# Patient Record
Sex: FEMALE | Race: WHITE | Employment: FULL TIME | URBAN - METROPOLITAN AREA
[De-identification: names, ages, dates, MRNs, and addresses within clinical notes are randomized per-mention and may not be internally consistent; named-entity substitution may affect disease eponyms.]

---

## 2022-08-15 NOTE — H&P
Assessment/Plan  # Detail Type Description    1. Assessment Dysphagia, oropharyngeal phase (R13.12). Impression Pt of Dr. Sweta Willams, here for 5yr Recall, for surveillance of colonic adenomas last addressed in 2016. *FHx of (CRC) Colorectal cancer: -Paternal Grandmother: Colon Cancer (Onset Age: 66's)    She also has alarm features of Upper GI complaints, including: Dysphagia, Dysphonia, Globus Sensation, Cough, Gagging, and Abnormal weight loss to be evaluated.  ___________________________________  Dysphagia to solids only x7 months   Gagging, coughing, Globus sensation and sore throat  Pt says that when she bends or lays down she feels a lump in her throat. Pt had a MRI of the throat (see in chart),    Pt never had a EGD    -Previous smoker: Quit 7 years ago in June  -Previous EtOH use: Quit 10-12 years ago. Patient Plan *EGD Plan: **Schedule 1st**   Will proceed with EGD with Dr. Bhavna Lopez, to evaluate upper GI tract for abnormalities, evidence to explain symptoms, and Dixon's epithelium with biopsies, polypectomy, or dilation as indicated. -Patient is advised that they should take their aspirin (if prescribed) up until the day of procedure.  -Patient is advised to take Thyroid meds, BP meds, beta blocker and any cardiac meds the AM of procedure with sip clear liquid, 4 hours prior to procedure start time. -Bring inhalers to procedure.  -Diabetic medication instructions (See Below). *EGD Risks:  Explained risks of procedure to include bleeding, infection, reaction to sedation, and perforation with possible need for admission to the hospital, and in the most extensive of  circumstances, the patient may require surgery. Pt verbalized understanding of these risks and is agreeable with this procedure.   ______________________________________   *Dysphagia - \"Safe Swallowing\" Precautions - Patient advised to:  (Printed copy provided to patient)  1) Take your time when eating. 2) Sit up straight.   3) Take small bites and sips. 4) Chew food thoroughly before swallowing. 5) Reduce talking and distractions during meals. 6) Make sure your mouth is empty before taking the next bite. Plan Orders Further diagnostic evaluations ordered today include(s) UPPER GI ENDOSCOPY, DIAGNOSIS to be performed. She will be scheduled for GASTROENTEROLOGY PROCEDURE, Next Lab Date is within 4  month on 08/03/2022. Clinical information/comments: at location [facility]. The surgeon scheduled is Nando Frey MD. An assistant has not been requested. 2. Assessment Dysphonia (R49.0). Impression Pt states she can feel pressure on her vocal cords when bending forward, and when laying on left side she experiences Dysphonia. Pt attributes these symptoms to possible polyps on vocal cords, and states it feels like whatever is in her throat is causing the pressure that traps her food at the cervical level, and causes the globus sensation, coughing and gagging. Patient Plan *Referral Placed:   -ENT Referral- to Dr. Mine Taveras, for evaluation and treatment of Dysphonia (?Vocal Cord Polyps)         3. Assessment Feeling of foreign body in throat (R09.89). Impression See above. 4. Assessment Abnormal weight loss (R63.4). Impression Pt has had a great deal of dental work done lately, with all teeth pulled, and further plans in 3 more months to place implants in lower jaw to anchor dentures to. This has likely contributed to unintentional weight loss, secondary to dental pain, in addition to family-related stress as she has a large amount of caregiver-role burden as well. 5. Assessment Personal history of colonic polyps (Z86.010). Impression Pt of Dr. Mesfin Marques, here for 5yr Recall, for surveillance of colonic adenomas last addressed in 2016.     *FHx of (CRC) Colorectal cancer:   -Paternal Grandmother: Colon Cancer (Onset Age: 66's)  _________________________________  *Last colonoscopy (1st exam- @50yo) was done 6/13/2016 by Dr. Maria Guadalupe Tong @[facility]: Extremely tortuous and fixed sigmoid. 1x small 6mm sessile polyp in the sigmoid colon at 25cm, cold snared (Hyperplastic). 1x sessile polyp 6.5mm, hot snared from the Proximal Transverse colon (Adenomatous Polyp Fragments). 5yr Recall recommended.  _________________________________  Asymptomatic of Lower GI complaints. BMI: 24.46 (Short Frame), BM: 1x daily. Patient Plan *C-scope Plan: **Schedule 2nd**   Colonoscopy ordered with Dr. Maria Guadalupe Tong with Miralax bowel prep, and Mag Citrate 2 days before prep, and Miralax and stool softeners starting 3 days before prep.  -Patient is advised that they should take their aspirin (if prescribed) up until the day of procedure.  -Patient is advised to take Thyroid meds, BP meds, beta blockers, and any cardiac meds the AM of procedure with sip clear liquid after prep. -Bring inhalers to procedure.  -Diabetic medication instructions (See Below). *C-scope Risks:  Stressed importance of following all bowel preparation instructions. Explained the procedure to the patient including all risks and benefits. These risks consist of missed lesions on exam, bleeding, and bowel perforation with possible need for admission to the hospital, and in the most extensive of  circumstances, the patient may require surgery. Pt verbalized understanding of these risks and is agreeable with this procedure. Plan Orders Further diagnostic evaluations ordered today include(s) DIAGNOSTIC COLONOSCOPY to be performed. She will be scheduled for GASTROENTEROLOGY PROCEDURE, Next Lab Date is within 4  month on 08/16/2022. Clinical information/comments: at location [facility]. The surgeon scheduled is Teresita Maria MD. An assistant has not been requested. 6. Assessment Family history of malignant neoplasm of digestive organ (Z80.0). Impression *FHx of (CRC) Colorectal cancer:   -Paternal Grandmother: Colon Cancer (Onset Age: 66's). 7. Assessment Type 2 diabetes mellitus with diabetic polyneuropathy, without long-term current use of insulin (E11.42). Impression Diabetic patient, on oral medication: Metformin. Patient Plan -Patient is advised not to take pills for diabetes the day ahead of the procedure. Patient is advised (if insulin dependent) to take half of the usual long acting insulin the day before procedure (or as advised by the prescribing provider), and to follow accuchecks closely, 'rescuing' for a blood sugar below 90              This 64year old  patient was referred by Ramses Shaw. This 64year old female presents for Hx polyp/colon cancer and Dysphagia. History of Present Illness  1. Hx polyp/colon cancer   Prior screening:  colonoscopy. Risk Factors: Paternal Grandmother. Pertinent negatives include abdominal pain, change in bowel habits, change in stool caliber, constipation, decreased appetite, diarrhea, melena, nausea, rectal bleeding, vomiting, weight gain and weight loss. Additional information: Patient has family history of colon cancer and Family  hx of colon ca ( Paternal Grandmother age: 66's). BM: 1x daily. Comments: *Last colonoscopy (1st exam- @50yo) was done 6/13/2016 by Dr. Sarwat Huang @[facility]: Extremely tortuous and fixed sigmoid. 1x small 6mm sessile polyp in the sigmoid colon at 25cm, cold snared (Hyperplastic). 1x sessile polyp 6.5mm, hot snared from the Proximal Transverse colon (Adenomatous Polyp Fragments). 5yr Recall recommended. 2.  Dysphagia   The difficulty in swallowing began 7 months ago and lasted varies. The symptoms are severe, worsened and occur constantly. The symptoms occur with solids only and cause choking and gagging. The patient denies aggravating factors. The patient denies relieving factors. The patient is also experiencing bloating, choking, cough, food sticking, foreign body sensation in throat and sore throat.  The patient denies anorexia, back pain, chest pain, chest pressure or discomfort, cough following swallowing, decreased appetite, early satiety, epigastric pain, food on pillow in AM, forced regurgitation, halitosis, heartburn, hoarseness, nasal regurgitation, nausea, oral regurgitation, vomiting, weight gain or weight loss. Additional information:  Pt says that when she bends or lays down she feels a lump in her throat. pt had a MRI of the throat ( see in chart),  Pt never had a EGD.           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     Unequal blood pressure in arms 04/05/2022 N         Medications (active prior to today)  Medication Instructions Start Date Stop Date Refilled Elsewhere   ProAir HFA 90 mcg/actuation aerosol inhaler inhale 2 puff by INHALATION route 4 times every day as needed 06/21/2021 06/21/2021 N   EpiPen 2-Casey 0.3 mg/0.3 mL injection, auto-injector inject 0.3 milliliter by intramuscular route once as needed for anaphylaxis 06/21/2021 06/21/2021 N   losartan 100 mg tablet take 1 tablet by oral route  every day 08/21/2021 08/21/2021 N   Praluent Pen 75 mg/mL subcutaneous pen injector inject 1 milliliter by subcutaneous route  every 2 weeks in the abdomen, thigh, or upper arm rotating injection sites 11/15/2021  11/15/2021 N   Narcan 4 mg/actuation nasal spray spray 0.1 milliliter by intranasal route in 1 nostril may repeat dose every 2-3 minutes as needed alternating nostrils with each dose 01/05/2022   N   METFORMIN ER TAB 500MG GP TAKE 2 TABLETS DAILY WITH  THE EVENING MEAL 01/25/2022 01/25/2022 N   Xanax 0.5 mg tablet Take 1 tablet by mouth three times daily as needed 03/04/2022 03/04/2022 N   gabapentin 300 mg capsule take 1 capsule by oral route 3 times every day 03/23/2022 03/23/2022 N     Patient Status   Completed with information received for patient in a summary of care record. Medication Reconciliation  Medications reconciled today.     Medication Reviewed  Adherence Medication Name Sig Desc Elsewhere Status   taking as directed METFORMIN ER TAB 500MG GP TAKE 2 TABLETS DAILY WITH  THE EVENING MEAL N Verified   taking as directed ProAir HFA 90 mcg/actuation aerosol inhaler inhale 2 puff by INHALATION route 4 times every day as needed N Verified   taking as directed Praluent Pen 75 mg/mL subcutaneous pen injector inject 1 milliliter by subcutaneous route  every 2 weeks in the abdomen, thigh, or upper arm rotating injection sites N Verified   taking as directed gabapentin 300 mg capsule take 1 capsule by oral route 3 times every day N Verified   taking as directed EpiPen 2-Casey 0.3 mg/0.3 mL injection, auto-injector inject 0.3 milliliter by intramuscular route once as needed for anaphylaxis N Verified   taking as directed Xanax 0.5 mg tablet Take 1 tablet by mouth three times daily as needed N Verified   taking as directed Narcan 4 mg/actuation nasal spray spray 0.1 milliliter by intranasal route in 1 nostril may repeat dose every 2-3 minutes as needed alternating nostrils with each dose N Verified   taking as directed losartan 100 mg tablet take 1 tablet by oral route  every day N Verified     Medications (Added, Continued or Stopped today)  Start Date Medication Directions PRN Status PRN Reason Instruction Stop Date   06/21/2021 EpiPen 2-Casey 0.3 mg/0.3 mL injection, auto-injector inject 0.3 milliliter by intramuscular route once as needed for anaphylaxis N      03/23/2022 gabapentin 300 mg capsule take 1 capsule by oral route 3 times every day N      08/21/2021 losartan 100 mg tablet take 1 tablet by oral route  every day N      01/25/2022 METFORMIN ER TAB 500MG GP TAKE 2 TABLETS DAILY WITH  THE EVENING MEAL N      01/05/2022 Narcan 4 mg/actuation nasal spray spray 0.1 milliliter by intranasal route in 1 nostril may repeat dose every 2-3 minutes as needed alternating nostrils with each dose N      11/15/2021 Praluent Pen 75 mg/mL subcutaneous pen injector inject 1 milliliter by subcutaneous route  every 2 weeks in the abdomen, thigh, or upper arm rotating injection sites N      06/21/2021 ProAir HFA 90 mcg/actuation aerosol inhaler inhale 2 puff by INHALATION route 4 times every day as needed N      03/04/2022 Xanax 0.5 mg tablet Take 1 tablet by mouth three times daily as needed N        Allergies  Ingredient Reaction (Severity) Medication Name Comment   LATEX Anaphylaxis     LEVOFLOXACIN Hives     ONDANSETRON HCL Rash (moderate to severe) Zofran    PENICILLINS Anaphylaxis       Reviewed, no changes. Review of Systems  System Neg/Pos Details   Constitutional Negative Fever, Weight gain and Weight loss. ENMT Positive Foreign body sensation in throat, Sore throat. ENMT Negative Halitosis, Hoarseness and Sinus Infection. Eyes Negative Double vision. Respiratory Positive Cough. Respiratory Negative Asthma, Chronic cough and Dyspnea. Cardio Negative Chest pain, Chest pressure or discomfort, Edema and Irregular heartbeat/palpitations. GI Positive Bloating, Choking, Food sticking. GI Negative Abdominal pain, Anorexia, Change in bowel habits, Change in stool caliber, Constipation, Cough following swallowing, Decreased appetite, Diarrhea, Dysphagia, Early satiety, Epigastric pain, Food on pillow in AM, Forceful vomiting, Heartburn, Hematemesis, Hematochezia, Melena, Nasal regurgitation, Nausea, Oral regurgitation, Rectal bleeding, Reflux and Vomiting.  Negative Dysuria and Hematuria. Endocrine Negative Cold intolerance and Heat intolerance.    Neuro Negative Dizziness, Headache, Numbness and Tremors. Psych Negative Anxiety, Depression and Increased stress. Integumentary Negative Hives, Pruritus and Rash. MS Negative Back pain, Joint pain and Myalgia. Hema/Lymph Negative Easy bleeding, Easy bruising and Lymphadenopathy. Allergic/Immuno Negative Food allergies and Immunosuppression. Vital Signs   Height  Time ft in cm Last Measured Height Position   1:27 PM 5.0 5.00 165.10 04/05/2022 Standing     Weight/BSA/BMI  Time lb oz kg Context BMI kg/m2 BSA m2   1:27 .00  66.678 dressed with shoes 24.46 1.75     16/22 1105 -- 65 159/77 Abnormal  Sinus Rhythm    08/16/22 1100 -- 69 145/67 Abnormal  Sinus Rhythm    08/16/22 0945 97.3 °F (36.3 °C) 69 128/63 --        Respiratory Therapy (last day)    Date/Time Resp SpO2 O2 Device O2 Flow Rate (L/min) [facility]   08/16/22 1105 14 99 % None (Room air) --    08/16/22 1100 18 97 % None (Room air) --    08/16/22 0945 18 99 % None (Room air) --      Physical  Exam  Exam Findings Details   Female GI Quick Visit Comments Short Frame. Constitutional Normal Well developed. Eyes Normal Conjunctiva - Right: Normal, Left: Normal. Sclera - Right: Normal, Left: Normal.   Nasopharynx Normal Lips/teeth/gums - Normal.   Neck Exam Normal Inspection - Normal.   Respiratory Normal Inspection - Normal.   Cardiovascular Normal Regular rate and rhythm. No murmurs, gallops, or rubs. Vascular Normal Pulses - Brachial: Normal.   Skin Normal Inspection - Normal.   Musculoskeletal Normal Hands/Wrist - Right: Normal, Left: Normal.   Extremity Normal No edema. Neurological Normal Fine motor skills - Normal.   Psychiatric Normal Orientation - Oriented to time, place, person & situation. Appropriate mood and affect. Patient Education  # Patient Education   1.  Colon Polyps: Care Instructions         Active Patient Care Team Members  Name Contact Agency Type Support Role Relationship Active Date Inactive Date Specialty   Barbara Hanks   Patient provider PCP   [facility]   encounter provider    Gastroenterology     No change in H&P

## 2022-08-16 ENCOUNTER — HOSPITAL ENCOUNTER (OUTPATIENT)
Age: 57
Setting detail: OUTPATIENT SURGERY
Discharge: HOME | End: 2022-08-16
Attending: INTERNAL MEDICINE | Admitting: INTERNAL MEDICINE
Payer: COMMERCIAL

## 2022-08-16 VITALS
OXYGEN SATURATION: 97 % | WEIGHT: 146 LBS | DIASTOLIC BLOOD PRESSURE: 72 MMHG | HEART RATE: 67 BPM | SYSTOLIC BLOOD PRESSURE: 134 MMHG | HEIGHT: 65 IN | BODY MASS INDEX: 24.32 KG/M2 | TEMPERATURE: 97.9 F | RESPIRATION RATE: 18 BRPM

## 2022-08-16 LAB — GLUCOSE BLD STRIP.AUTO-MCNC: 126 MG/DL (ref 70–110)

## 2022-08-16 PROCEDURE — 77030003657 HC NDL SCLER BSC -B: Performed by: INTERNAL MEDICINE

## 2022-08-16 PROCEDURE — 88305 TISSUE EXAM BY PATHOLOGIST: CPT

## 2022-08-16 PROCEDURE — 76040000009: Performed by: INTERNAL MEDICINE

## 2022-08-16 PROCEDURE — 99153 MOD SED SAME PHYS/QHP EA: CPT | Performed by: INTERNAL MEDICINE

## 2022-08-16 PROCEDURE — 2709999900 HC NON-CHARGEABLE SUPPLY: Performed by: INTERNAL MEDICINE

## 2022-08-16 PROCEDURE — 77030013991 HC SNR POLYP ENDOSC BSC -A: Performed by: INTERNAL MEDICINE

## 2022-08-16 PROCEDURE — 77030013992 HC SNR POLYP ENDOSC BSC -B: Performed by: INTERNAL MEDICINE

## 2022-08-16 PROCEDURE — 77030020268 HC MISC GENERAL SUPPLY: Performed by: INTERNAL MEDICINE

## 2022-08-16 PROCEDURE — 74011250636 HC RX REV CODE- 250/636: Performed by: INTERNAL MEDICINE

## 2022-08-16 PROCEDURE — G0500 MOD SEDAT ENDO SERVICE >5YRS: HCPCS | Performed by: INTERNAL MEDICINE

## 2022-08-16 PROCEDURE — 82962 GLUCOSE BLOOD TEST: CPT

## 2022-08-16 PROCEDURE — 77030040361 HC SLV COMPR DVT MDII -B: Performed by: INTERNAL MEDICINE

## 2022-08-16 RX ORDER — MIDAZOLAM HYDROCHLORIDE 1 MG/ML
INJECTION, SOLUTION INTRAMUSCULAR; INTRAVENOUS AS NEEDED
Status: DISCONTINUED | OUTPATIENT
Start: 2022-08-16 | End: 2022-08-16 | Stop reason: HOSPADM

## 2022-08-16 RX ORDER — FENTANYL CITRATE 50 UG/ML
INJECTION, SOLUTION INTRAMUSCULAR; INTRAVENOUS AS NEEDED
Status: DISCONTINUED | OUTPATIENT
Start: 2022-08-16 | End: 2022-08-16 | Stop reason: HOSPADM

## 2022-08-16 RX ORDER — SODIUM CHLORIDE 9 MG/ML
125 INJECTION, SOLUTION INTRAVENOUS CONTINUOUS
Status: DISCONTINUED | OUTPATIENT
Start: 2022-08-16 | End: 2022-08-16 | Stop reason: HOSPADM

## 2022-08-16 RX ORDER — LOSARTAN POTASSIUM AND HYDROCHLOROTHIAZIDE 12.5; 5 MG/1; MG/1
1 TABLET ORAL DAILY
COMMUNITY

## 2022-08-16 RX ORDER — ALIROCUMAB 75 MG/ML
75 INJECTION, SOLUTION SUBCUTANEOUS EVERY 2 WEEKS
COMMUNITY

## 2022-08-16 RX ADMIN — SODIUM CHLORIDE 125 ML/HR: 9 INJECTION, SOLUTION INTRAVENOUS at 09:45

## 2022-08-16 NOTE — DISCHARGE INSTRUCTIONS
Chris Velasquez  [id number]  1965    COLON DISCHARGE INSTRUCTIONS    Discomfort:  Redness at IV site- apply warm compress to area; if redness or soreness persist- contact your physician  There may be a slight amount of blood passed from the rectum  Gaseous discomfort- walking, belching will help relieve any discomfort  You may not operate a vehicle til the next day. You may not engage in an occupation involving machinery or appliances til the next day. You may not drink alcoholic beverages til the next day. DIET:   High fiber diet. ACTIVITY:  You may not  resume your normal daily activities til the next day. it is recommended that you spend the remainder of the day resting -  avoid any strenuous activity. CALL M.D.  IF ANY SIGN OF:   Increasing pain, nausea, vomiting  Abdominal distension (swelling)  New increased bleeding (oral or rectal)  Fever (chills)  Pain in chest area  Bloody discharge from nose or mouth  Shortness of breath    You may not  take any Advil, Aspirin, Ibuprofen, Motrin, Aleve, or Goodys for 14 days, ONLY  Tylenol as needed for pain. Post procedure diagnosis:  tortuous sigmoid; polyps; Follow-up Instructions: Your follow up colonoscopy will be in 3 years. We will notify you the results of your biopsy by letter within 2 weeks. Armando Moreira MD  August 16, 2022     DISCHARGE SUMMARY from Nurse    PATIENT INSTRUCTIONS:    After general anesthesia or intravenous sedation, for 24 hours or while taking prescription Narcotics:  Limit your activities  Do not drive and operate hazardous machinery  Do not make important personal or business decisions  Do  not drink alcoholic beverages  If you have not urinated within 8 hours after discharge, please contact your surgeon on call.     Report the following to your surgeon:  Excessive pain, swelling, redness or odor of or around the surgical area  Temperature over 100.5  Nausea and vomiting lasting longer than 4 hours or if unable to take medications  Any signs of decreased circulation or nerve impairment to extremity: change in color, persistent  numbness, tingling, coldness or increase pain  Any questions    What to do at Home:  Recommended activity: NODRIVING/NODRINKING ALCOHOL/NO RECREATIONAL DRUG USE/NO LEGAL/CRITICAL DECISION MAKING    If you experience any of the following symptoms AS ABOVE, please follow up with DR. SIDDIQI'S OFFICE. *  Please give a list of your current medications to your Primary Care Provider. *  Please update this list whenever your medications are discontinued, doses are      changed, or new medications (including over-the-counter products) are added. *  Please carry medication information at all times in case of emergency situations. These are general instructions for a healthy lifestyle:    No smoking/ No tobacco products/ Avoid exposure to second hand smoke  Surgeon General's Warning:  Quitting smoking now greatly reduces serious risk to your health. Obesity, smoking, and sedentary lifestyle greatly increases your risk for illness    A healthy diet, regular physical exercise & weight monitoring are important for maintaining a healthy lifestyle    You may be retaining fluid if you have a history of heart failure or if you experience any of the following symptoms:  Weight gain of 3 pounds or more overnight or 5 pounds in a week, increased swelling in our hands or feet or shortness of breath while lying flat in bed. Please call your doctor as soon as you notice any of these symptoms; do not wait until your next office visit. The discharge information has been reviewed with the patient and caregiver. The patient and caregiver verbalized understanding. Discharge medications reviewed with the patient and caregiver and appropriate educational materials and side effects teaching were provided. Patient armband removed and shredded    ___________________________________________________________________________________________________________________________________

## 2022-08-16 NOTE — PROCEDURES
[facility]  Colonoscopy Procedure Report  _______________________________________________________  Patient: Nhan Begum                                        Attending Physician: Pernell Romero MD    Patient ID: [id number]                                    Referring Physician: Rhys Carroll MD    Exam Date: 8/16/2022      Introduction: A  62 y.o. female patient, presents for inpatient Colonoscopy    Indications: Pt of Dr. Clement Lanza, here for surveillance of colonic adenomas. FHx of (CRC) -Paternal Grandmother: Colon Cancer (Onset Age: 69's)Last colonoscopy (1st exam- @50yo) was done 6/13/2016 by Dr. Abdulkadir Gonzalez @[facility]: Extremely tortuous and fixed sigmoid. 1x small 6mm sessile polyp in the sigmoid colon at 25cm, cold snared (Hyperplastic). 1x sessile polyp 6.5mm, hot snared from the Proximal Transverse colon (Adenomatous Polyp Fragments). Asymptomatic of Lower GI complaints. BMI: 24.46 (Short Frame), BM: 1x daily. Had 2 c section and total hysterectomy for endometriosis. Consent: The benefits, risks, and alternatives to the procedure were discussed and informed consent was obtained from the patient. Preparation: EKG, pulse, pulse oximetry and blood pressure were monitored throughout the procedure. ASA Classification: Class II- . The heart is an S1-S2 and regular heart rate and rhythm. Lungs are clear to auscultation and percussion. Abdomen is soft, nondistended, and nontender. Mental Status: awake, alert, and oriented to person, place, and time    Medications:  Fentanyl 300 mcg IV and Versed 13 mg IV,   Rectal Exam: Normal Rectal Exam. No Blood. Pathology Specimens:  2    Procedure: The colonoscope was passed with extreme difficulty through the anus under direct visualization and advanced to the cecum. The patient required positioning on the back and external counter pressure to aid in the passage of the scope. The scope was withdrawn and the mucosa was carefully examined.  The quality of the preparation was very good. The views were excellent. The patient's toleration of the procedure was poor. The exam was done a second time to the cecum using the gastroscope. Total time is 86 minutes and withdrawal time is 63 minutes. Findings:    Rectum:   Medium sized internal hemorrhoids. Sigmoid:   Extremely difficult tortuous, loopy and fixed sigmoid. 4 polyps found one flat in the distal part at 20 cm, 7 mm hot snared the 2 others one 5 mm hyperplastic, cold snared 2 adjacent sessile 4 and 6 mm polyps in the proximal sigmoid at very difficult and unstable turn. The smaller one is cold snared but was unable to relocate the second one next to it even after multiple difficult passes and using the gastroscope. After 86 minutes. I decided to stop. Descending Colon:   Normal   Transverse Colon:   Normal   Ascending Colon:   Normal  Cecum:   Lipomatosis of the ileocecal valve. 14 mm flat cecal polyp, removed in one block using the 30 mm hot snare after saline injection then a metallic clip is applied to the polypectomy site to prevent delayed complication. Terminal Ileum:   Not entered. Unplanned Events: There were no unplanned events. Estimated Blood Loss: None  IMPLANTS: * No implants in log *  Impressions: Medium sized internal hemorrhoids. Extremely difficult tortuous, loopy and fixed sigmoid. 4 polyps found one flat in the distal part at 20 cm, 7 mm hot snared the 2 others one 5 mm hyperplastic, cold snared 2 adjacent sessile 4 and 6 mm polyps in the proximal sigmoid at very difficult and unstable turn. The smaller one is cold snared but was unable to relocate the second one next to it even after multiple difficult passes and using the gastroscope. After 86 minutes. I decided to stop. Lipomatosis of the ileocecal valve.  14 mm flat cecal polyp, removed in one block using the 30 mm hot snare after saline injection then a metallic clip is applied to the polypectomy site to prevent delayed complication. Normal Mucosa. No blood, or AVM found. Complications: None; patient tolerated the procedure well. Recommendations:  Discharge home when standard parameters are met. Resume a high fiber diet. Resume own medications. Avoid all NSAID's for 2 weeks  Colonoscopy recommendation in 3 years with the pediatric colonoscope. Take Miralax and/ or Colace 100 mg on regular basis if constipated    Procedure Codes:    Vallery Nyhan.   COLONOSCPY,FLEX,W/DIR SUBMUC INJECT    Endoscope Information:  Model Number(s)    [id number]   Assistant: None  Signed By: Paulina Buckner MD Date: 8/16/2022

## (undated) DEVICE — SYR 3ML LL TIP 1/10ML GRAD --

## (undated) DEVICE — SYR 5ML 1/5 GRAD LL NSAF LF --

## (undated) DEVICE — ERBE NESSY®PLATE 170 SPLIT; 168CM²; CABLE 3M: Brand: ERBE

## (undated) DEVICE — NDL FLTR TIP 5 MIC 18GX1.5IN --

## (undated) DEVICE — SNARE POLYP SM W13MMXL240CM SHTH DIA2.4MM OVL FLX DISP

## (undated) DEVICE — NDL PRT INJ NSAF BLNT 18GX1.5 --

## (undated) DEVICE — WRISTBAND ID AD W2.5XL9.5CM RED VYN ADH CLSR UNI-PRINT

## (undated) DEVICE — NDL INJ SCLERO 25G 240CM -- INTERJECT M00518360 BX/5

## (undated) DEVICE — MAJ-1414 SINGLE USE ADPATER BIOPSY VALV: Brand: SINGLE USE ADAPTOR BIOPSY VALVE

## (undated) DEVICE — SOLUTION IV 500ML 0.9% SOD CHL FLX CONT

## (undated) DEVICE — TUBING, SUCTION, 1/4" X 12', STRAIGHT: Brand: MEDLINE

## (undated) DEVICE — Device

## (undated) DEVICE — TRNQT TEXT 1X18IN BLU LF DISP -- CONVERT TO ITEM 362165

## (undated) DEVICE — GARMENT,MEDLINE,DVT,INT,CALF,MED, GEN2: Brand: MEDLINE

## (undated) DEVICE — SPONGE GZ W4XL4IN COT 12 PLY TYP VII WVN C FLD DSGN

## (undated) DEVICE — CANNULA CUSH AD W/ 14FT TBG

## (undated) DEVICE — SINGLE PORT MANIFOLD: Brand: NEPTUNE 2

## (undated) DEVICE — CATH SUC CTRL PRT TRIFLO 14FR --

## (undated) DEVICE — SYRINGE 50ML E/T

## (undated) DEVICE — KENDALL RADIOLUCENT FOAM MONITORING ELECTRODE RECTANGULAR SHAPE: Brand: KENDALL

## (undated) DEVICE — SET ADMIN 16ML TBNG L100IN 2 Y INJ SITE IV PIGGY BK DISP

## (undated) DEVICE — SNARE ENDOSCP L240CM SHTH DIA2.4MM LOOP W30MM MIN WRK CHN

## (undated) DEVICE — STERIS ASSURANCE CLIP

## (undated) DEVICE — TRAP SPEC COLL POLYP POLYSTYR --

## (undated) DEVICE — SPONGE GZ W4XL4IN RAYON POLY 4 PLY NONWOVEN FASTER WICKING

## (undated) DEVICE — CATH IV SAFE STR 22GX1IN BLU -- PROTECTIV PLUS